# Patient Record
Sex: MALE | Race: WHITE | Employment: OTHER
[De-identification: names, ages, dates, MRNs, and addresses within clinical notes are randomized per-mention and may not be internally consistent; named-entity substitution may affect disease eponyms.]

---

## 2024-09-26 RX ORDER — MONTELUKAST SODIUM 10 MG/1
10 TABLET ORAL NIGHTLY
COMMUNITY
End: 2024-09-26

## 2024-09-26 RX ORDER — TAMSULOSIN HYDROCHLORIDE 0.4 MG/1
0.4 CAPSULE ORAL DAILY
COMMUNITY
End: 2024-09-26

## 2024-09-26 RX ORDER — NEBIVOLOL 2.5 MG/1
TABLET ORAL DAILY
COMMUNITY

## 2024-09-26 RX ORDER — ROSUVASTATIN CALCIUM 10 MG/1
10 TABLET, COATED ORAL DAILY
COMMUNITY

## 2024-09-26 NOTE — PERIOP NOTE
[facility] Pre-Operative Instructions      Surgery Date: Monday, 9/30          Time of Arrival: TBD/TBC @ [phone]    On the day of your surgery, please report to surgical services registration desk and sign in at your designated time.  The Surgery Center is located to the right of the Emergency Room.     2. You must have someone with you to drive you home. You should not drive a car for 24 hours following surgery. Please make arrangements for a friend or family member to stay with you for the first 24 hours after your surgery.    3. Do not have anything to eat or drink (including water, gum, mints, coffee, juice, etc.) after midnight the night prior to surgery. This may not apply to medications prescribed by your physician. (Please note below the special instructions with medications to take the morning of your procedure.)    4. We recommend you do not drink any alcoholic beverages for 24 hours before and after your surgery.    5. Contact your surgeon's office for instructions on the following medications: non-steroidal anti-inflammatory drugs (Advil, Ibuprofen, Motrin, Aleve, etc.) vitamins and supplements. (Some surgeon's will want you to stop these medications prior to surgery and others may allow you to take them) **If you are currently taking a blood-thinning agent (Plavix, Coumadin, Eliquis, Aspirin, etc.), contact your surgeon for instructions.** Your surgeon will partner with the physician prescribing these medications to determine if it is safe to stop or if you need to continue taking.  Please do not stop taking these medications without instructions from your surgeon    6. Wear comfortable clothes.  Wear glasses instead of contacts.  Do not bring any money or jewelry. Please bring picture ID, insurance card, and any prearranged co-payment or hospital payment.  Do not wear make-up, particularly mascara the morning of your surgery.  Do not wear nail polish, particularly if you are

## 2024-09-30 ENCOUNTER — ANESTHESIA (OUTPATIENT)
Facility: HOSPITAL | Age: 59
End: 2024-09-30
Payer: COMMERCIAL

## 2024-09-30 ENCOUNTER — HOSPITAL ENCOUNTER (OUTPATIENT)
Facility: HOSPITAL | Age: 59
Setting detail: OUTPATIENT SURGERY
Discharge: HOME | End: 2024-09-30
Attending: UROLOGY | Admitting: UROLOGY
Payer: COMMERCIAL

## 2024-09-30 ENCOUNTER — ANESTHESIA EVENT (OUTPATIENT)
Facility: HOSPITAL | Age: 59
End: 2024-09-30
Payer: COMMERCIAL

## 2024-09-30 VITALS
HEART RATE: 81 BPM | DIASTOLIC BLOOD PRESSURE: 89 MMHG | WEIGHT: 237.44 LBS | BODY MASS INDEX: 37.27 KG/M2 | OXYGEN SATURATION: 95 % | TEMPERATURE: 97.8 F | SYSTOLIC BLOOD PRESSURE: 132 MMHG | HEIGHT: 67 IN | RESPIRATION RATE: 16 BRPM

## 2024-09-30 LAB
ERYTHROCYTE [DISTWIDTH] IN BLOOD BY AUTOMATED COUNT: 12.2 % (ref 11.5–14.5)
HCT VFR BLD AUTO: 49.6 % (ref 36.6–50.3)
HGB BLD-MCNC: 17.1 G/DL (ref 12.1–17)
MCH RBC QN AUTO: 30.6 PG (ref 26–34)
MCHC RBC AUTO-ENTMCNC: 34.5 G/DL (ref 30–36.5)
MCV RBC AUTO: 88.9 FL (ref 80–99)
NRBC # BLD: 0 K/UL (ref 0–0.01)
NRBC BLD-RTO: 0 PER 100 WBC
PLATELET # BLD AUTO: 174 K/UL (ref 150–400)
PMV BLD AUTO: 10.2 FL (ref 8.9–12.9)
RBC # BLD AUTO: 5.58 M/UL (ref 4.1–5.7)
WBC # BLD AUTO: 8.9 K/UL (ref 4.1–11.1)

## 2024-09-30 PROCEDURE — 88300 SURGICAL PATH GROSS: CPT

## 2024-09-30 PROCEDURE — 3600000004 HC SURGERY LEVEL 4 BASE: Performed by: UROLOGY

## 2024-09-30 PROCEDURE — 36415 COLL VENOUS BLD VENIPUNCTURE: CPT

## 2024-09-30 PROCEDURE — 3700000001 HC ADD 15 MINUTES (ANESTHESIA): Performed by: UROLOGY

## 2024-09-30 PROCEDURE — 7100000010 HC PHASE II RECOVERY - FIRST 15 MIN: Performed by: UROLOGY

## 2024-09-30 PROCEDURE — 2580000003 HC RX 258: Performed by: UROLOGY

## 2024-09-30 PROCEDURE — 6360000002 HC RX W HCPCS

## 2024-09-30 PROCEDURE — 6370000000 HC RX 637 (ALT 250 FOR IP): Performed by: UROLOGY

## 2024-09-30 PROCEDURE — 3700000000 HC ANESTHESIA ATTENDED CARE: Performed by: UROLOGY

## 2024-09-30 PROCEDURE — 7100000001 HC PACU RECOVERY - ADDTL 15 MIN: Performed by: UROLOGY

## 2024-09-30 PROCEDURE — 6360000002 HC RX W HCPCS: Performed by: UROLOGY

## 2024-09-30 PROCEDURE — C1758 CATHETER, URETERAL: HCPCS | Performed by: UROLOGY

## 2024-09-30 PROCEDURE — 85027 COMPLETE CBC AUTOMATED: CPT

## 2024-09-30 PROCEDURE — 2500000003 HC RX 250 WO HCPCS

## 2024-09-30 PROCEDURE — 2580000003 HC RX 258: Performed by: ANESTHESIOLOGY

## 2024-09-30 PROCEDURE — 7100000000 HC PACU RECOVERY - FIRST 15 MIN: Performed by: UROLOGY

## 2024-09-30 PROCEDURE — 3600000014 HC SURGERY LEVEL 4 ADDTL 15MIN: Performed by: UROLOGY

## 2024-09-30 PROCEDURE — 2709999900 HC NON-CHARGEABLE SUPPLY: Performed by: UROLOGY

## 2024-09-30 PROCEDURE — 2580000003 HC RX 258

## 2024-09-30 RX ORDER — FENTANYL CITRATE 50 UG/ML
INJECTION, SOLUTION INTRAMUSCULAR; INTRAVENOUS
Status: DISCONTINUED | OUTPATIENT
Start: 2024-09-30 | End: 2024-09-30 | Stop reason: SDUPTHER

## 2024-09-30 RX ORDER — ONDANSETRON 2 MG/ML
INJECTION INTRAMUSCULAR; INTRAVENOUS
Status: DISCONTINUED | OUTPATIENT
Start: 2024-09-30 | End: 2024-09-30 | Stop reason: SDUPTHER

## 2024-09-30 RX ORDER — ONDANSETRON 2 MG/ML
4 INJECTION INTRAMUSCULAR; INTRAVENOUS
Status: DISCONTINUED | OUTPATIENT
Start: 2024-09-30 | End: 2024-09-30 | Stop reason: HOSPADM

## 2024-09-30 RX ORDER — ROCURONIUM BROMIDE 10 MG/ML
INJECTION, SOLUTION INTRAVENOUS
Status: DISCONTINUED | OUTPATIENT
Start: 2024-09-30 | End: 2024-09-30 | Stop reason: SDUPTHER

## 2024-09-30 RX ORDER — HYDROMORPHONE HYDROCHLORIDE 1 MG/ML
0.5 INJECTION, SOLUTION INTRAMUSCULAR; INTRAVENOUS; SUBCUTANEOUS EVERY 5 MIN PRN
Status: DISCONTINUED | OUTPATIENT
Start: 2024-09-30 | End: 2024-09-30 | Stop reason: HOSPADM

## 2024-09-30 RX ORDER — OXYCODONE HYDROCHLORIDE 5 MG/1
5 TABLET ORAL
Status: DISCONTINUED | OUTPATIENT
Start: 2024-09-30 | End: 2024-09-30 | Stop reason: HOSPADM

## 2024-09-30 RX ORDER — DEXAMETHASONE SODIUM PHOSPHATE 4 MG/ML
INJECTION, SOLUTION INTRA-ARTICULAR; INTRALESIONAL; INTRAMUSCULAR; INTRAVENOUS; SOFT TISSUE
Status: DISCONTINUED | OUTPATIENT
Start: 2024-09-30 | End: 2024-09-30 | Stop reason: SDUPTHER

## 2024-09-30 RX ORDER — NALOXONE HYDROCHLORIDE 0.4 MG/ML
INJECTION, SOLUTION INTRAMUSCULAR; INTRAVENOUS; SUBCUTANEOUS PRN
Status: DISCONTINUED | OUTPATIENT
Start: 2024-09-30 | End: 2024-09-30 | Stop reason: HOSPADM

## 2024-09-30 RX ORDER — SODIUM CHLORIDE 0.9 % (FLUSH) 0.9 %
5-40 SYRINGE (ML) INJECTION EVERY 12 HOURS SCHEDULED
Status: DISCONTINUED | OUTPATIENT
Start: 2024-09-30 | End: 2024-09-30 | Stop reason: HOSPADM

## 2024-09-30 RX ORDER — LIDOCAINE HYDROCHLORIDE 20 MG/ML
JELLY TOPICAL PRN
Status: DISCONTINUED | OUTPATIENT
Start: 2024-09-30 | End: 2024-09-30 | Stop reason: ALTCHOICE

## 2024-09-30 RX ORDER — SODIUM CHLORIDE, SODIUM LACTATE, POTASSIUM CHLORIDE, CALCIUM CHLORIDE 600; 310; 30; 20 MG/100ML; MG/100ML; MG/100ML; MG/100ML
INJECTION, SOLUTION INTRAVENOUS CONTINUOUS
Status: DISCONTINUED | OUTPATIENT
Start: 2024-09-30 | End: 2024-09-30 | Stop reason: HOSPADM

## 2024-09-30 RX ORDER — FENTANYL CITRATE 50 UG/ML
25 INJECTION, SOLUTION INTRAMUSCULAR; INTRAVENOUS EVERY 5 MIN PRN
Status: DISCONTINUED | OUTPATIENT
Start: 2024-09-30 | End: 2024-09-30 | Stop reason: HOSPADM

## 2024-09-30 RX ORDER — PHENAZOPYRIDINE HYDROCHLORIDE 200 MG/1
200 TABLET, FILM COATED ORAL 3 TIMES DAILY PRN
Qty: 12 TABLET | Refills: 0 | Status: SHIPPED | OUTPATIENT
Start: 2024-09-30 | End: 2024-10-03

## 2024-09-30 RX ORDER — PROPOFOL 10 MG/ML
INJECTION, EMULSION INTRAVENOUS
Status: DISCONTINUED | OUTPATIENT
Start: 2024-09-30 | End: 2024-09-30 | Stop reason: SDUPTHER

## 2024-09-30 RX ORDER — SODIUM CHLORIDE 0.9 % (FLUSH) 0.9 %
5-40 SYRINGE (ML) INJECTION PRN
Status: DISCONTINUED | OUTPATIENT
Start: 2024-09-30 | End: 2024-09-30 | Stop reason: HOSPADM

## 2024-09-30 RX ORDER — MIDAZOLAM HYDROCHLORIDE 1 MG/ML
INJECTION INTRAMUSCULAR; INTRAVENOUS
Status: DISCONTINUED | OUTPATIENT
Start: 2024-09-30 | End: 2024-09-30 | Stop reason: SDUPTHER

## 2024-09-30 RX ORDER — CEFUROXIME AXETIL 500 MG/1
500 TABLET ORAL 2 TIMES DAILY
Qty: 6 TABLET | Refills: 0 | Status: SHIPPED | OUTPATIENT
Start: 2024-09-30 | End: 2024-10-03

## 2024-09-30 RX ADMIN — PHENYLEPHRINE HYDROCHLORIDE 40 MCG/MIN: 10 INJECTION INTRAVENOUS at 13:11

## 2024-09-30 RX ADMIN — SODIUM CHLORIDE, POTASSIUM CHLORIDE, SODIUM LACTATE AND CALCIUM CHLORIDE: 600; 310; 30; 20 INJECTION, SOLUTION INTRAVENOUS at 12:05

## 2024-09-30 RX ADMIN — WATER 2000 MG: 1 INJECTION INTRAMUSCULAR; INTRAVENOUS; SUBCUTANEOUS at 13:08

## 2024-09-30 RX ADMIN — DEXAMETHASONE SODIUM PHOSPHATE 8 MG: 4 INJECTION INTRA-ARTICULAR; INTRALESIONAL; INTRAMUSCULAR; INTRAVENOUS; SOFT TISSUE at 13:10

## 2024-09-30 RX ADMIN — ONDANSETRON 4 MG: 2 INJECTION INTRAMUSCULAR; INTRAVENOUS at 13:23

## 2024-09-30 RX ADMIN — FENTANYL CITRATE 50 MCG: 50 INJECTION, SOLUTION INTRAMUSCULAR; INTRAVENOUS at 12:57

## 2024-09-30 RX ADMIN — PROPOFOL 200 MG: 10 INJECTION, EMULSION INTRAVENOUS at 12:57

## 2024-09-30 RX ADMIN — FENTANYL CITRATE 50 MCG: 50 INJECTION, SOLUTION INTRAMUSCULAR; INTRAVENOUS at 13:09

## 2024-09-30 RX ADMIN — Medication 3 AMPULE: at 12:05

## 2024-09-30 RX ADMIN — ROCURONIUM BROMIDE 50 MG: 10 INJECTION INTRAVENOUS at 12:59

## 2024-09-30 RX ADMIN — SUGAMMADEX 400 MG: 100 INJECTION, SOLUTION INTRAVENOUS at 13:26

## 2024-09-30 RX ADMIN — MIDAZOLAM HYDROCHLORIDE 2 MG: 1 INJECTION, SOLUTION INTRAMUSCULAR; INTRAVENOUS at 12:49

## 2024-09-30 ASSESSMENT — PAIN - FUNCTIONAL ASSESSMENT: PAIN_FUNCTIONAL_ASSESSMENT: 0-10

## 2024-09-30 NOTE — ANESTHESIA PRE PROCEDURE
Department of Anesthesiology  Preprocedure Note       Name:  Lion Gaffney   Age:  59 y.o.  :  1965                                          MRN:  [id number]         Date:  2024      Surgeon: Surgeon(s):  Lion Estrada MD    Procedure: Procedure(s):  CYSTOSLITHOLAPAXY    Medications prior to admission:   Prior to Admission medications    Medication Sig Start Date End Date Taking? Authorizing Provider   nebivolol (BYSTOLIC) 2.5 MG tablet Take by mouth daily   Yes ProviderCecilio MD   rosuvastatin (CRESTOR) 10 MG tablet Take 1 tablet by mouth daily   Yes ProviderCecilio MD       Current medications:    No current facility-administered medications for this encounter.     Current Outpatient Medications   Medication Sig Dispense Refill    nebivolol (BYSTOLIC) 2.5 MG tablet Take by mouth daily      rosuvastatin (CRESTOR) 10 MG tablet Take 1 tablet by mouth daily         Allergies:  No Known Allergies    Problem List:  There is no problem list on file for this patient.      Past Medical History:        Diagnosis Date    At risk for sleep apnea 2024    ISIAH of 6    Hyperlipidemia     Hypertension     Kidney stone     Urinary bladder stone        Past Surgical History:        Procedure Laterality Date    COLONOSCOPY      x3    KIDNEY STONE REMOVAL  1989    WISDOM TOOTH EXTRACTION         Social History:    Social History     Tobacco Use    Smoking status: Never    Smokeless tobacco: Never   Substance Use Topics    Alcohol use: Not Currently                                Counseling given: Not Answered      Vital Signs (Current):   Vitals:    24 1337   Weight: 106.1 kg (234 lb)   Height: 1.702 m (5' 7\")                                              BP Readings from Last 3 Encounters:   No data found for BP       NPO Status:                                                                                 BMI:   Wt Readings from Last 3 Encounters:   No data found for Wt     Body mass index is

## 2024-09-30 NOTE — BRIEF OP NOTE
Brief Postoperative Note      Patient: Lion Gaffney  YOB: 1965  MRN: [id number]    Date of Procedure: 9/30/2024    Pre-Op Diagnosis Codes:      * Bladder stone [N21.0]    Post-Op Diagnosis: Same       Procedure(s):  CYSTOSLITHOLAPAXY    Surgeon(s):  Lion Estrada MD    Assistant:  * No surgical staff found *    Anesthesia: General    Estimated Blood Loss (mL): Minimal    Complications: None    Specimens:   ID Type Source Tests Collected by Time Destination   A :   Lion Keyes MD 9/30/2024 1320        Implants:  * No implants in log *      Drains: * No LDAs found *    Findings:  Infection Present At Time Of Surgery (PATOS) (choose all levels that have infection present):  No infection present  Other Findings: 1.5 cm bladder calculus    Electronically signed by Lion Estrada MD on 9/30/2024 at 1:28 PM

## 2024-09-30 NOTE — DISCHARGE INSTRUCTIONS
Cystoscopy: What to Expect at Home  Your Recovery     A cystoscopy is a procedure that lets a doctor look inside of the bladder and the urethra. The urethra is the tube that carries urine from the bladder to outside the body. The doctor uses a thin, lighted tool called a cystoscope. Your bladder is filled with fluid. This stretches the bladder so that your doctor can look closely at the inside of your bladder.  After the cystoscopy, your urethra may be sore at first, and it may burn when you urinate for the first few days after the procedure. You may feel the need to urinate more often, and your urine may be pink. These symptoms should get better in 1 or 2 days. You will probably be able to go back to most of your usual activities in 1 or 2 days.  This care sheet gives you a general idea about how long it will take for you to recover. But each person recovers at a different pace. Follow the steps below to get better as quickly as possible.  How can you care for yourself at home?  Activity    Rest when you feel tired. Getting enough sleep will help you recover.     Try to walk each day. Start by walking a little more than you did the day before. Bit by bit, increase the amount you walk. Walking boosts blood flow and helps prevent pneumonia and constipation.     Avoid strenuous activities, such as bicycle riding, jogging, weight lifting, or aerobic exercise, until your doctor says it is okay.     Ask your doctor when you can drive again.     Most people are able to return to work within 1 or 2 days after the procedure.     You may shower and take baths as usual.     Ask your doctor when it is okay for you to have sex.   Diet    You can eat your normal diet. If your stomach is upset, try bland, low-fat foods like plain rice, broiled chicken, toast, and yogurt.     Drink plenty of fluids (unless your doctor tells you not to).   Medicines    Take pain medicines exactly as directed.  If the doctor gave you a

## 2024-09-30 NOTE — H&P
HPI  This patient has kidney stone disease with bladder stone and recent right kidney colic.  Recent history: Patient seen in ER 7/3/2024 with right kidney colic. CT demonstrated an upper ureteral stone on the right with mild hydro-7 mm. Incidental nonobstructing insignificant left renal stone. Gallstones also noted. Treated with MET. Has passed numerous stones in the past. Still having pain but it is in the suprapubic region just to the right. Mostly pressure. No hematuria. No fever.  Since last seen patient had another NCCT as he did not knowingly passed a stone which shows no ureteral stones but now a 1.1 cm bladder stone right side of bladder. We went over the films together.     Urine today has 3-5 red cells per high-power field.     7/3/2024-creatinine-1.15/eGFR 72.  9/16/[id number]-[id number]. Previous right hydro resolved. 4 mm calcific density left kidney unchanged. Bilateral hypo and hyperdense lesions unchanged likely representing proteinaceous/simple cysts. 1.1 cm calcified bladder stone on the right new since prior study.  Assessment / Plan  This patient has kidney stone disease with a bladder stone and recent right kidney colic.  Recent history: Patient seen in ER 7/3/2024 with right kidney colic. CT demonstrated an upper ureteral stone on the right with mild hydro-7 mm. Incidental nonobstructing insignificant left renal stone. Gallstones also noted. Treated with MET. Has passed numerous stones in the past. Still having pain but it is in the suprapubic region just to the right. Mostly pressure. No hematuria. No fever.  Since last seen patient had another NCCT 9/16/2024 as he did not knowingly passed a stone revealing no ureteral stones but now a 1.1 cm bladder stone right side of bladder.     Recommend holmium laser cystolithotripsy. Patient is in agreement. Will go on and schedule. The procedure along with the attendant risks and complications were discussed with the patient in detail along with alternative

## 2024-10-01 NOTE — OP NOTE
[facility]              [address] [place]  [address]                            OPERATIVE REPORT      PATIENT NAME: LION DELCID                : 1965  MED REC NO: [id number]                       ROOM: OR  ACCOUNT NO: [id number]                       ADMIT DATE: 2024  PROVIDER: Lion Estrada MD    DATE OF SERVICE:  2024    PREOPERATIVE DIAGNOSES:  Bladder calculus, 1.5 cm.    POSTOPERATIVE DIAGNOSES:  Bladder calculus, 1.5 cm.    PROCEDURES PERFORMED:  Cystolitholapaxy of the bladder stone with evacuation.    SURGEON:  Lion Estrada MD    ASSISTANT:  None.    ANESTHESIA:  gen    ESTIMATED BLOOD LOSS:  minimal    SPECIMENS REMOVED:  Bladder stone fragments.    INTRAOPERATIVE FINDINGS:  Large bladder stone with no evidence of any other disease.     COMPLICATIONS:  None.    IMPLANTS:  None.    INDICATIONS:  bladder calculus    DESCRIPTION OF PROCEDURE:  After informed consent, the patient received preoperative antibiotics and was placed on operating table in supine fashion.  After adequate induction of general anesthetic, he was placed in the lithotomy position.  All pressure points carefully padded.  The penis prepped and draped in a sterile fashion.  A full time-out was accomplished.  The 21-Haitian cystoscope was advanced under direct vision into the bladder.  A large stone was noted at the base of the bladder measuring by CT scan 1.5 cm.  Both ureter orifices were ectopic in nature and there was no evidence of disease in the bladder.  He had trilobar hyperplasia of the prostate with a moderate-sized median lobe.  I used the 400 micron fiber and thulium laser to fragment the stone into small fragments.  I then used the Ellik to evacuate these pieces and they were sent to pathology as gross only.  The bladder was emptied several times and hemostasis had been achieved.  The bladder was visualized and there was found to be no injury to the

## 2024-10-01 NOTE — ANESTHESIA POSTPROCEDURE EVALUATION
Department of Anesthesiology  Postprocedure Note    Patient: Lion Gaffney  MRN: [id number]  YOB: 1965  Date of evaluation: 10/1/2024    Procedure Summary       Date: 09/30/24 Room / Location: [facility] MAIN OR M3 / [facility] MAIN OR    Anesthesia Start: 1249 Anesthesia Stop: 1357    Procedure: CYSTOSLITHOLAPAXY (Perineum) Diagnosis:       Bladder stone      (Bladder stone [N21.0])    Providers: Lion Estrada MD Responsible Provider: Lion Hawk MD    Anesthesia Type: General ASA Status: 2            Anesthesia Type: General    Zhanna Phase I: Zhanna Score: 10    Zhanna Phase II:      Anesthesia Post Evaluation    Patient location during evaluation: PACU  Patient participation: complete - patient participated  Level of consciousness: awake and alert  Airway patency: patent  Nausea & Vomiting: no nausea and no vomiting  Cardiovascular status: hemodynamically stable  Respiratory status: acceptable  Hydration status: stable        No notable events documented.

## (undated) DEVICE — GLOVE ORANGE PI 7 1/2   MSG9075

## (undated) DEVICE — SOLUTION IRRIG 1000ML 0.9% SOD CHL USP POUR PLAS BTL

## (undated) DEVICE — GOWN,SIRUS,POLYRNF,BRTHSLV,XL,30/CS: Brand: MEDLINE

## (undated) DEVICE — SOLUTION IRRIG 1000ML STRL H2O USP PLAS POUR BTL

## (undated) DEVICE — SOLUTION IRRIG 3000ML 0.9% SOD CHL USP UROMATIC PLAS CONT

## (undated) DEVICE — OPEN-END URETERAL CATHETER: Brand: COOK

## (undated) DEVICE — CYSTO MRMC: Brand: MEDLINE INDUSTRIES, INC.